# Patient Record
Sex: FEMALE | NOT HISPANIC OR LATINO | Employment: FULL TIME | URBAN - METROPOLITAN AREA
[De-identification: names, ages, dates, MRNs, and addresses within clinical notes are randomized per-mention and may not be internally consistent; named-entity substitution may affect disease eponyms.]

---

## 2023-07-21 ENCOUNTER — OFFICE VISIT (OUTPATIENT)
Dept: AUDIOLOGY | Facility: CLINIC | Age: 28
End: 2023-07-21
Payer: COMMERCIAL

## 2023-07-21 DIAGNOSIS — Z01.12 ENCOUNTER FOR HEARING CONSERVATION AND TREATMENT: Primary | ICD-10-CM

## 2023-07-21 PROCEDURE — V5275 EAR IMPRESSION: HCPCS | Mod: RT

## 2023-07-21 NOTE — PROGRESS NOTES
AUDIOLOGY REPORT: CUSTOM HEARING PROTECTION     SUBJECTIVE: Tanya Solorzano is a 27 year old female and was seen in the Audiology Clinic at [facility] for custom hearing protection. The patient was unaccompanied to today's appointment        Background:              Patient is here today to obtain bilateral earmold impressions and to order custom hearing protection.      Procedures:              Otoscopy revealed clear ear canals, bilaterally. Bilateral earmold impressions were taken without incident and with good pre/post otoscopy. Donnie chose style CIC sculpted canal (Tyrell) in the color champagne, with a 17 dB filter.       Plan:              Once the custom ear pieces have arrived, please call for pick-up.      CHARGES: ()- Earmold impressions ($30)     Juanjo Lindsay, CCC-A  Licensed Audiologist  [place] #[id number]      07/21/23

## 2023-08-08 ENCOUNTER — TELEPHONE (OUTPATIENT)
Dept: AUDIOLOGY | Facility: CLINIC | Age: 28
End: 2023-08-08
Payer: COMMERCIAL

## 2023-08-08 NOTE — TELEPHONE ENCOUNTER
Called patient to notify custom hearing protection has arrived. Offered her an appointment today to pick them up, but she would like to keep her 8/15 appointment     Juanjo Lindsay, CCC-A  Licensed Audiologist  [place] #[id number]      08/08/23

## 2023-08-15 ENCOUNTER — OFFICE VISIT (OUTPATIENT)
Dept: AUDIOLOGY | Facility: CLINIC | Age: 28
End: 2023-08-15
Payer: COMMERCIAL

## 2023-08-15 DIAGNOSIS — Z01.12 ENCOUNTER FOR HEARING CONSERVATION AND TREATMENT: Primary | ICD-10-CM

## 2023-08-15 PROCEDURE — V5264 EAR MOLD/INSERT: HCPCS | Mod: RT | Performed by: AUDIOLOGIST

## 2023-08-15 NOTE — PROGRESS NOTES
AUDIOLOGY REPORT    SUBJECTIVE: Tanya Solorzano is a 27 year old female was seen in the Audiology Clinic at  [facility] on 8/15/23 to be fit with new earmolds for hearing protection. Patient was unaccompanied to today's visit.     OBJECTIVE:    Otoscopy revealed ears are clear of cerumen bilaterally.     New earmolds for hearing protection were fit bilaterally. The earmolds for hearing protection fit comfortably and securely. Patient reports good physical fit and comfort with the new earmolds for hearing protection. Patient demonstrated ease of insertion and removal of the new earmolds for hearing protection. SN: Right: [id number] & Left: [id number]    ASSESSMENT:   Hearing conservation     Reviewed warranty information with patient. The 90 day remake period was explained to patient.    PLAN: Tanya will evaluate the new earmolds for hearing protection and return if further adjustments are needed. Please contact this clinic with any questions or concerns.    Lopez Keen CCC-A  Licensed Audiologist #[id number]  8/15/2023

## 2024-05-19 ENCOUNTER — HEALTH MAINTENANCE LETTER (OUTPATIENT)
Age: 29
End: 2024-05-19

## 2025-06-08 ENCOUNTER — HEALTH MAINTENANCE LETTER (OUTPATIENT)
Age: 30
End: 2025-06-08